# Patient Record
Sex: FEMALE | Race: WHITE | NOT HISPANIC OR LATINO | Employment: FULL TIME | URBAN - METROPOLITAN AREA
[De-identification: names, ages, dates, MRNs, and addresses within clinical notes are randomized per-mention and may not be internally consistent; named-entity substitution may affect disease eponyms.]

---

## 2024-10-21 ENCOUNTER — TELEPHONE (OUTPATIENT)
Dept: OBSTETRICS AND GYNECOLOGY | Facility: CLINIC | Age: 24
End: 2024-10-21
Payer: COMMERCIAL

## 2024-10-21 NOTE — TELEPHONE ENCOUNTER
----- Message from Alva MENDEZ sent at 10/21/2024  9:17 AM EDT -----  Please call pt back regarding her labs.

## 2024-10-25 ENCOUNTER — INITIAL PRENATAL (OUTPATIENT)
Dept: OBSTETRICS AND GYNECOLOGY | Facility: CLINIC | Age: 24
End: 2024-10-25
Payer: COMMERCIAL

## 2024-10-25 VITALS — DIASTOLIC BLOOD PRESSURE: 80 MMHG | WEIGHT: 171 LBS | SYSTOLIC BLOOD PRESSURE: 102 MMHG

## 2024-10-25 DIAGNOSIS — Z34.91 INITIAL OBSTETRIC VISIT IN FIRST TRIMESTER: Primary | ICD-10-CM

## 2024-10-25 DIAGNOSIS — Z72.0 VAPES NICOTINE CONTAINING SUBSTANCE: ICD-10-CM

## 2024-10-25 RX ORDER — PRENATAL VIT NO.126/IRON/FOLIC 28MG-0.8MG
TABLET ORAL DAILY
COMMUNITY

## 2024-10-25 NOTE — PROGRESS NOTES
Initial ob visit     CC- Here for care of pregnancy        Nora Monroy is a 24 y.o. female, , who presents for her first obstetrical visit.  No LMP recorded. Patient is pregnant.. Her MIKAYLA is 2025, by Ultrasound. Current GA is 6w3d.     Initial positive test date : 10/16/2024, UPT        Her periods are every 26 days.  Prior obstetric issues: none  Patient's past medical history is significant for:  denies .  Family history of genetic issues (includes FOB): denies  Prior infections concerning in pregnancy (Rash, fever in last 2 weeks): No  Varicella Hx - vaccinated  Prior testing for Cystic Fibrosis Carrier or Sickle Cell Trait- denies  Prepregnancy BMI - There is no height or weight on file to calculate BMI.  History of STD: no  Hx of HSV for patient or partner: no  Ultrasound Today: yes    OB History    Para Term  AB Living   1             SAB IAB Ectopic Molar Multiple Live Births                    # Outcome Date GA Lbr Barry/2nd Weight Sex Type Anes PTL Lv   1 Current                Additional Pertinent History   Last Pap :  Result: negative HPV: negative     Last Completed Pap Smear       This patient has no relevant Health Maintenance data.          History of abnormal Pap smear: no  Family history of uterine, colon, breast, or ovarian cancer: no  Feelings of Anxiety or Depression: yes - self controlled  Tobacco Usage?: No   Alcohol/Drug Use?: NO  Over the age of 35 at delivery: no  Genetic Screening: desires no genetic testing  Flu Status: Desires at future appt    [facility]    Current Outpatient Medications:     prenatal vitamin (prenatal, CLASSIC, vitamin) tablet, Take  by mouth Daily., Disp: , Rfl:      History reviewed. No pertinent past medical history.     Past Surgical History:   Procedure Laterality Date    FINGER SURGERY      thumb    WISDOM TOOTH EXTRACTION         Review of Systems   Review of Systems    Patient Reports:  denies  Patient Denies:excessive nausea ,  excessive vomiting, and vaginal bleeding  All systems reviewed and otherwise normal.    I have reviewed and agree with the HPI, ROS, and historical information as entered above. Sarah Spencer MD      /80   Wt 77.6 kg (171 lb)     The additional following portions of the patient's history were reviewed and updated as appropriate: allergies, current medications, past family history, past medical history, past social history, past surgical history, and problem list.    Physical Exam  General:  well developed; well nourished  no acute distress  mentation appropriate   Chest/Respiratory: No labored breathing, normal respiratory effort, normal appearance, no respiratory noises noted   Heart:  not examined   Thyroid: normal to inspection and palpation   Breasts:  Not performed.   Abdomen: soft, non-tender; no masses  no umbilical or inguinal hernias are present  no hepato-splenomegaly   Pelvis: Not performed.        Assessment and Plan    Problem List Items Addressed This Visit       Initial obstetric visit in first trimester - Primary    Relevant Orders    Obstetric Panel    HIV-1 / O / 2 Ag / Antibody    Urine Culture - Urine, Urine, Clean Catch    Chlamydia trachomatis, Neisseria gonorrhoeae, PCR - Urine, Urine, Clean Catch    Urine Drug Screen - Urine, Clean Catch    Vapes nicotine containing substance       Pregnancy at 6w3d  Reviewed routine prenatal care with the office and educational materials given  Lab(s) Ordered  Discussed options for genetic testing including first trimester nuchal translucency screen, genetic disease carrier testing, quadruple screen, and NIPT  Discontinue the use of all non-medicinal drugs and chemicals  Nausea/Vomiting - she does not desire medications at this time.  Discussed conservative ways to help with nausea.  Patient is on Prenatal vitamins  Activity recommendation : 150 minutes/week of moderate intensity aerobic activity unless we limit for bleeding, hypertension or  other pregnancy complication   Encouraged to quit vaping.  Return in about 4 weeks (around 11/22/2024).      Sarah Spencer MD  10/25/2024

## 2024-10-26 LAB
ABO GROUP BLD: NORMAL
BASOPHILS # BLD AUTO: 0 X10E3/UL (ref 0–0.2)
BASOPHILS NFR BLD AUTO: 0 %
BLD GP AB SCN SERPL QL: NEGATIVE
EOSINOPHIL # BLD AUTO: 0 X10E3/UL (ref 0–0.4)
EOSINOPHIL NFR BLD AUTO: 0 %
ERYTHROCYTE [DISTWIDTH] IN BLOOD BY AUTOMATED COUNT: 12.6 % (ref 11.7–15.4)
HBV SURFACE AG SERPL QL IA: NEGATIVE
HCT VFR BLD AUTO: 41.6 % (ref 34–46.6)
HCV IGG SERPL QL IA: NON REACTIVE
HGB BLD-MCNC: 13.3 G/DL (ref 11.1–15.9)
HIV 1+2 AB+HIV1 P24 AG SERPL QL IA: NON REACTIVE
IMM GRANULOCYTES # BLD AUTO: 0 X10E3/UL (ref 0–0.1)
IMM GRANULOCYTES NFR BLD AUTO: 0 %
LYMPHOCYTES # BLD AUTO: 1.5 X10E3/UL (ref 0.7–3.1)
LYMPHOCYTES NFR BLD AUTO: 21 %
MCH RBC QN AUTO: 27.7 PG (ref 26.6–33)
MCHC RBC AUTO-ENTMCNC: 32 G/DL (ref 31.5–35.7)
MCV RBC AUTO: 87 FL (ref 79–97)
MONOCYTES # BLD AUTO: 0.4 X10E3/UL (ref 0.1–0.9)
MONOCYTES NFR BLD AUTO: 6 %
NEUTROPHILS # BLD AUTO: 5.1 X10E3/UL (ref 1.4–7)
NEUTROPHILS NFR BLD AUTO: 73 %
PLATELET # BLD AUTO: 244 X10E3/UL (ref 150–450)
RBC # BLD AUTO: 4.81 X10E6/UL (ref 3.77–5.28)
RH BLD: POSITIVE
RPR SER QL: NON REACTIVE
RUBV IGG SERPL IA-ACNC: 1.95 INDEX
WBC # BLD AUTO: 7.1 X10E3/UL (ref 3.4–10.8)

## 2024-10-28 LAB
AMPHETAMINES UR QL SCN: NEGATIVE NG/ML
BACTERIA UR CULT: NORMAL
BACTERIA UR CULT: NORMAL
BARBITURATES UR QL SCN: NEGATIVE NG/ML
BENZODIAZ UR QL SCN: NEGATIVE NG/ML
BZE UR QL SCN: NEGATIVE NG/ML
C TRACH RRNA SPEC QL NAA+PROBE: NEGATIVE
CANNABINOIDS UR QL SCN: NEGATIVE NG/ML
CREAT UR-MCNC: 148.7 MG/DL (ref 20–300)
LABORATORY COMMENT REPORT: NORMAL
METHADONE UR QL SCN: NEGATIVE NG/ML
N GONORRHOEA RRNA SPEC QL NAA+PROBE: NEGATIVE
OPIATES UR QL SCN: NEGATIVE NG/ML
OXYCODONE+OXYMORPHONE UR QL SCN: NEGATIVE NG/ML
PCP UR QL: NEGATIVE NG/ML
PH UR: 6.9 [PH] (ref 4.5–8.9)
PROPOXYPH UR QL SCN: NEGATIVE NG/ML

## 2024-11-21 ENCOUNTER — ROUTINE PRENATAL (OUTPATIENT)
Dept: OBSTETRICS AND GYNECOLOGY | Facility: CLINIC | Age: 24
End: 2024-11-21
Payer: COMMERCIAL

## 2024-11-21 VITALS — WEIGHT: 173 LBS | SYSTOLIC BLOOD PRESSURE: 110 MMHG | DIASTOLIC BLOOD PRESSURE: 78 MMHG

## 2024-11-21 DIAGNOSIS — Z34.91 PRENATAL CARE IN FIRST TRIMESTER: Primary | ICD-10-CM

## 2024-11-21 PROBLEM — Z34.90 PREGNANCY: Status: ACTIVE | Noted: 2024-10-25

## 2024-11-21 NOTE — PROGRESS NOTES
OB FOLLOW UP  CC- Here for care of pregnancy        Nora Monroy is a 24 y.o.  10w2d patient being seen today for her obstetrical follow up visit. Patient reports no complaints.     Her prenatal care is complicated by (and status) :   Patient Active Problem List   Diagnosis    Pregnancy    Vapes nicotine containing substance       Genetic testing?: declines.  NOB labs reviewed  Flu Status: Will get at work/pharmacy/other facility   Ultrasound Today: No    ROS -   Patient Denies: leaking of fluid, vaginal bleeding, dysuria, excessive vomiting, and more than 6 contractions per hour  All other systems reviewed and are negative.     The additional following portions of the patient's history were reviewed and updated as appropriate: allergies, current medications, past family history, past medical history, past social history, past surgical history, and problem list.    I have reviewed and agree with the HPI, ROS, and historical information as entered above. Jessie Tejeda, APRN          /78   Wt 78.5 kg (173 lb)         EXAM:     Prenatal Vitals  BP: 110/78  Weight: 78.5 kg (173 lb)   Fetal Heart Rate: pos                  Assessment and Plan    Problem List Items Addressed This Visit    None  Visit Diagnoses       Prenatal care in first trimester    -  Primary    Relevant Orders    [id number] PLUS Core+SCA+ESS - Blood,            Pregnancy at 10w2d  Labs reviewed from New OB Visit.  Counseled on genetic testing, carrier status and option for NT screen  Activity and Exercise discussed.  Patient is on Prenatal vitamins  Return in about 4 weeks (around 2024) for CASE ANIRUDH.    Jessie Tejeda, SHERRILL  2024

## 2024-12-20 ENCOUNTER — ROUTINE PRENATAL (OUTPATIENT)
Dept: OBSTETRICS AND GYNECOLOGY | Facility: CLINIC | Age: 24
End: 2024-12-20
Payer: COMMERCIAL

## 2024-12-20 VITALS — DIASTOLIC BLOOD PRESSURE: 76 MMHG | WEIGHT: 177.6 LBS | SYSTOLIC BLOOD PRESSURE: 112 MMHG

## 2024-12-20 DIAGNOSIS — Z34.02 FIRST PREGNANCY, SECOND TRIMESTER: Primary | ICD-10-CM

## 2024-12-20 DIAGNOSIS — Z36.89 ENCOUNTER FOR FETAL ANATOMIC SURVEY: ICD-10-CM

## 2024-12-20 NOTE — PROGRESS NOTES
OB FOLLOW UP  CC- Here for care of pregnancy        Nora Monroy is a 24 y.o.  14w3d patient being seen today for her obstetrical follow up visit. Patient reports having lower abdominal cramping that comes and goes.      Her prenatal care is complicated by (and status) :   Patient Active Problem List   Diagnosis    Pregnancy    Vapes nicotine containing substance       Genetic testing?: already completed and was normal.  NOB labs reviewed  Flu Status: Will get at work/pharmacy/other facility   Ultrasound Today: No    ROS -   Patient Denies: leaking of fluid, vaginal bleeding, dysuria, excessive vomiting, and more than 6 contractions per hour  All other systems reviewed and are negative.     The additional following portions of the patient's history were reviewed and updated as appropriate: allergies, current medications, past family history, past medical history, past social history, past surgical history, and problem list.    I have reviewed and agree with the HPI, ROS, and historical information as entered above. Daja Vargas MD          /76   Wt 80.6 kg (177 lb 9.6 oz)         EXAM:     Prenatal Vitals  BP: 112/76  Weight: 80.6 kg (177 lb 9.6 oz)   Fetal Heart Rate: pos                  Assessment and Plan    Problem List Items Addressed This Visit    None  Visit Diagnoses       First pregnancy, second trimester    -  Primary    Encounter for fetal anatomic survey        Relevant Orders    US Ob 14 + Weeks Single or First Gestation            Pregnancy at 14w3d  Labs reviewed from New OB Visit.  Counseled on genetic testing, carrier status and option for NT screen  Activity and Exercise discussed.  Patient is on Prenatal vitamins  Return in about 4 weeks (around 2025) for US with Next Visit.    Daja Vargas MD  2024

## 2025-01-17 ENCOUNTER — ROUTINE PRENATAL (OUTPATIENT)
Dept: OBSTETRICS AND GYNECOLOGY | Facility: CLINIC | Age: 25
End: 2025-01-17
Payer: COMMERCIAL

## 2025-01-17 VITALS — DIASTOLIC BLOOD PRESSURE: 70 MMHG | SYSTOLIC BLOOD PRESSURE: 102 MMHG | WEIGHT: 184 LBS

## 2025-01-17 DIAGNOSIS — Z34.02 FIRST PREGNANCY, SECOND TRIMESTER: Primary | ICD-10-CM

## 2025-01-17 DIAGNOSIS — O28.3 ABNORMAL FETAL ULTRASOUND: ICD-10-CM

## 2025-01-17 LAB
GLUCOSE UR STRIP-MCNC: NEGATIVE MG/DL
PROT UR STRIP-MCNC: NEGATIVE MG/DL

## 2025-01-17 NOTE — PROGRESS NOTES
OB FOLLOW UP  CC- Here for care of pregnancy        Nora Monroy is a 24 y.o.  18w3d patient being seen today for her obstetrical follow up visit. Patient reports having rash on palm of right hand she has used hydrocortisone cream but it comes back and has spread into fingers     Her prenatal care is complicated by (and status) : see below.  Patient Active Problem List   Diagnosis    Pregnancy    Vapes nicotine containing substance       Flu Status: Will get at work/pharmacy/other facility   Ultrasound Today: Yes    AFP: is undecided about    ROS -   Patient Denies: leaking of fluid, vaginal bleeding, dysuria, excessive vomiting, and more than 6 contractions per hour  All other systems reviewed and are negative.       The additional following portions of the patient's history were reviewed and updated as appropriate: allergies, current medications, past family history, past medical history, past social history, past surgical history, and problem list.      I have reviewed and agree with the HPI, ROS, and historical information as entered above. Daja Vargas MD          EXAM:     Prenatal Vitals  BP: 102/70  Weight: 83.5 kg (184 lb)   Fetal Heart Rate: pos         Urine Glucose Read-only: Negative  Urine Protein Read-only: Negative           Assessment and Plan    Problem List Items Addressed This Visit    None  Visit Diagnoses       First pregnancy, second trimester    -  Primary    Relevant Orders    POC Urinalysis Dipstick (Completed)    Abnormal fetal ultrasound        Relevant Orders    [facility]            Pregnancy at 18w3d  Fetal status reassuring.   U/S reviewed and poss VSD, EIF noted.  PDC in 3-4 weeks and see me in [place].  Marginal cord insertion.    Anatomy scan next visit.   Activity and Exercise discussed.  Patient is on Prenatal vitamins  Return in about 3 weeks (around 2025), or pdc scan and see me in [place] 3-4 weeks., for US with Next Visit.    Daja SANTIAGO  MD Alicia  01/17/2025

## 2025-02-06 ENCOUNTER — TELEPHONE (OUTPATIENT)
Dept: OBSTETRICS AND GYNECOLOGY | Facility: CLINIC | Age: 25
End: 2025-02-06
Payer: COMMERCIAL

## 2025-02-06 RX ORDER — AMOXICILLIN 500 MG/1
500 CAPSULE ORAL 2 TIMES DAILY
Qty: 20 CAPSULE | Refills: 0 | Status: SHIPPED | OUTPATIENT
Start: 2025-02-06 | End: 2025-02-16

## 2025-02-06 NOTE — TELEPHONE ENCOUNTER
Reviewed with Yamilka neal with sending in Amoxicillin and offer Paxlovid.  Returned call to patient and offered paxlovid.  Patient agreeable. Reviewed safe to take medications for cough/cold./congestion with patient, and she voiced understanding.  Homa send in discussed medications to pharmacy on file.      Will send Pristones message to f/u with pcp PRN

## 2025-02-06 NOTE — TELEPHONE ENCOUNTER
LOS patient- 21 2/7    Returned patient call- she reports having a colleague at work ([employer]) test her for flu/covid/strep due to beginning to feel bad this morning.  She c/o sore throat, fever and fluid in her ears.  She reports +Covid and faintly + strep, negative for flu.  Is wondering if we can send in prescription.  Advised patient that I would discuss with Homa and return her call.  She verified and voiced understanding.     Requesting rx be sent to [facility], [place]

## 2025-02-06 NOTE — TELEPHONE ENCOUNTER
Provider: DR. GALAN     Caller: Nora Monroy    Relationship to Patient: Self    Pharmacy: [facility] IN [place]     Phone Number: [phone]    Reason for Call: COVID POSITIVE - POSSIBLE STREP POSITIVE     When was the patient last seen: 01/17/25    When did it start: TODAY    Where is it located: NOSE, THROAT    Characteristics of symptom/severity: SORE THROAT, STUFFY, FEVER, FLUID IN EARS     HUB WAS UNABLE TO WARM TRANSFER     PATIENT IS WANTING TO KNOW WHAT IS SAFE TO TAKE.    ALSO IS NOT ABLE PCP UNTIL LATE NEXT WEEK. PATIENT WAS WONDERING IF DR GALAN COULD CALL SOMETHING IN.

## 2025-02-13 ENCOUNTER — HOSPITAL ENCOUNTER (OUTPATIENT)
Dept: WOMENS IMAGING | Facility: HOSPITAL | Age: 25
Discharge: HOME | End: 2025-02-13
Admitting: OBSTETRICS & GYNECOLOGY
Payer: COMMERCIAL

## 2025-02-13 ENCOUNTER — ROUTINE PRENATAL (OUTPATIENT)
Dept: OBSTETRICS AND GYNECOLOGY | Facility: CLINIC | Age: 25
End: 2025-02-13
Payer: COMMERCIAL

## 2025-02-13 ENCOUNTER — OFFICE VISIT (OUTPATIENT)
Dept: OBSTETRICS AND GYNECOLOGY | Facility: HOSPITAL | Age: 25
End: 2025-02-13
Payer: COMMERCIAL

## 2025-02-13 VITALS
WEIGHT: 188.2 LBS | HEIGHT: 63 IN | BODY MASS INDEX: 33.35 KG/M2 | SYSTOLIC BLOOD PRESSURE: 110 MMHG | DIASTOLIC BLOOD PRESSURE: 83 MMHG

## 2025-02-13 VITALS
DIASTOLIC BLOOD PRESSURE: 70 MMHG | SYSTOLIC BLOOD PRESSURE: 102 MMHG | BODY MASS INDEX: 33.13 KG/M2 | HEIGHT: 63 IN | WEIGHT: 187 LBS

## 2025-02-13 DIAGNOSIS — Z34.92 PRENATAL CARE IN SECOND TRIMESTER: Primary | ICD-10-CM

## 2025-02-13 DIAGNOSIS — Z3A.22 22 WEEKS GESTATION OF PREGNANCY: ICD-10-CM

## 2025-02-13 DIAGNOSIS — O35.9XX0 SUSPECTED FETAL ABNORMALITY AFFECTING MANAGEMENT OF MOTHER, ANTEPARTUM, SINGLE OR UNSPECIFIED FETUS: Primary | ICD-10-CM

## 2025-02-13 LAB
GLUCOSE UR STRIP-MCNC: NEGATIVE MG/DL
PROT UR STRIP-MCNC: NEGATIVE MG/DL

## 2025-02-13 PROCEDURE — 76811 OB US DETAILED SNGL FETUS: CPT

## 2025-02-13 PROCEDURE — 93325 DOPPLER ECHO COLOR FLOW MAPG: CPT

## 2025-02-13 PROCEDURE — 76825 ECHO EXAM OF FETAL HEART: CPT

## 2025-02-13 NOTE — PROGRESS NOTES
"    OB FOLLOW UP  CC- Here for care of pregnancy        Nora Monroy is a 24 y.o.  22w2d patient being seen today for her obstetrical follow up visit. Patient reports vision changes. Patient states that she is having blurred vision if she is not wearing her glasses. Patient is currently taking Amoxicillin for Strep. Patient also reports intermittent rash on her right palm that has since resolved with antifungal cream.     Her prenatal care is complicated by (and status) : see below.  Patient Active Problem List   Diagnosis    Pregnancy    Vapes nicotine containing substance       Flu Status: Declines  Ultrasound Today: Yes with PDC  Reviewed 1 hr glucose testing and TDAP next visit.    ROS -   Patient Denies: leaking of fluid, vaginal bleeding, dysuria, excessive vomiting, and more than 6 contractions per hour  Fetal Movement : normal  Other than what is documented in the HPI, all other systems reviewed and are negative.       The additional following portions of the patient's history were reviewed and updated as appropriate: allergies and current medications.      I have reviewed and agree with the HPI, ROS, and historical information as entered above. Daja Vargas MD      /70   Ht 160 cm (63\")   Wt 84.8 kg (187 lb)   BMI 33.13 kg/m²       EXAM:     Prenatal Vitals  BP: 102/70  Weight: 84.8 kg (187 lb)   Fetal Heart Rate: pos               Urine Glucose Read-only: Negative  Urine Protein Read-only: Negative       Assessment and Plan    Problem List Items Addressed This Visit    None  Visit Diagnoses       Prenatal care in second trimester    -  Primary    Relevant Orders    POC Urinalysis Dipstick (Completed)            Pregnancy at 22w2d  Fetal status reassuring.  anatomy scan completed today and within normal limits. At PDC  1 hour gtt, CBC, Antibody screen, and RPR next visit. Instructions given  Discussed/encouraged TDAP vaccination after 28 weeks  Activity and Exercise " discussed.  Return in about 22 days (around 3/7/2025), or [place], for One hour glucola.      Daja Vargas MD  02/13/2025

## 2025-02-21 PROBLEM — O35.9XX0 SUSPECTED FETAL ABNORMALITY AFFECTING MANAGEMENT OF MOTHER, ANTEPARTUM: Status: ACTIVE | Noted: 2025-02-21

## 2025-02-21 NOTE — PROGRESS NOTES
Patient seen in Maternal Fetal Medicine clinic today. Please see full note in under imaging tab of patient chart in Epic (Viewpoint report).    Maci Albert MD

## 2025-03-03 ENCOUNTER — TELEPHONE (OUTPATIENT)
Dept: OBSTETRICS AND GYNECOLOGY | Facility: CLINIC | Age: 25
End: 2025-03-03
Payer: COMMERCIAL

## 2025-03-03 RX ORDER — LANSOPRAZOLE 30 MG/1
30 CAPSULE, DELAYED RELEASE ORAL DAILY
Qty: 30 CAPSULE | Refills: 1 | Status: SHIPPED | OUTPATIENT
Start: 2025-03-03 | End: 2026-03-03

## 2025-03-03 NOTE — TELEPHONE ENCOUNTER
Provider: DR GALAN    Caller: Nora Monroy    Relationship to Patient: Self    Phone Number: [phone]    Reason for Call: OB PT CALLED STATED SHE HAS TRIED PEPCID, OMEPRAZOLE 20MG FOR HEART BURN AND NOTHING IS HELPING HER; PT WOULD LIKE TO KNOW IF THERE IS SOMETHING STRONGER THAT COULD HELP HER THAT IS SAFE FOR HER TO TAKE; PT STATED IT IS MISERABLE; PT STATED IT FEELS LIKE HER DIAPHRAGM FEELS REALLY TIGHT AS WELL.    PLEASE CALL PT TO ADVISE.

## 2025-03-03 NOTE — TELEPHONE ENCOUNTER
Reviewed with Yamilka neal to do d/c omeprazole and do prevacid, and rx sent.  Returned patient call and reviewed this with patient.  She verified and voiced understanding

## 2025-03-07 ENCOUNTER — ROUTINE PRENATAL (OUTPATIENT)
Dept: OBSTETRICS AND GYNECOLOGY | Facility: CLINIC | Age: 25
End: 2025-03-07
Payer: COMMERCIAL

## 2025-03-07 VITALS — WEIGHT: 191.8 LBS | DIASTOLIC BLOOD PRESSURE: 70 MMHG | SYSTOLIC BLOOD PRESSURE: 104 MMHG | BODY MASS INDEX: 33.98 KG/M2

## 2025-03-07 DIAGNOSIS — Z34.02 FIRST PREGNANCY, SECOND TRIMESTER: ICD-10-CM

## 2025-03-07 DIAGNOSIS — Z13.1 SCREENING FOR DIABETES MELLITUS: Primary | ICD-10-CM

## 2025-03-07 LAB
GLUCOSE UR STRIP-MCNC: NEGATIVE MG/DL
PROT UR STRIP-MCNC: NEGATIVE MG/DL

## 2025-03-07 NOTE — PROGRESS NOTES
OB FOLLOW UP  CC- Here for care of pregnancy        Nora Monroy is a 25 y.o.  25w3d patient being seen today for her obstetrical follow up. Patient reports having severe acid reflux. She started taking Lansoprazole 4 days ago and feels like it may be helping.     Patient undergoing Glucola testing today. She is due for her testing at 10:52AM.       MBT: A+  Rhogam: is not indicated.  28 week packet: reviewed with patient , counseled on fetal movement , and breast pump discussed  TDAP: will receive next visit  Flu Status: Declines  Ultrasound Today: No    Her prenatal care is complicated by (and status) : see below.  Patient Active Problem List   Diagnosis    Pregnancy    Vapes nicotine containing substance    Suspected fetal abnormality affecting management of mother, antepartum         ROS -   Patient Denies: Loss of Fluid, Vaginal Spotting, Vision Changes, Headaches, Nausea , Vomiting , Contractions, Epigastric pain, and skin itching  Fetal Movement : normal  Other than what is documented in the HPI, all other systems reviewed and are negative.     The additional following portions of the patient's history were reviewed and updated as appropriate: allergies and current medications.    I have reviewed and agree with the HPI, ROS, and historical information as entered above. Daja Vargas MD      /70   Wt 87 kg (191 lb 12.8 oz)   BMI 33.98 kg/m²         EXAM:     Prenatal Vitals  BP: 104/70  Weight: 87 kg (191 lb 12.8 oz)                   Urine Glucose Read-only: Negative  Urine Protein Read-only: Negative         Assessment and Plan    Problem List Items Addressed This Visit    None  Visit Diagnoses       Screening for diabetes mellitus    -  Primary    Relevant Orders    CBC (No Diff)    Gestational Screen 1 Hr ([facility])    Antibody Screen    RPR, Rfx Qn RPR / Confirm TP    First pregnancy, second trimester        Relevant Orders    POC Urinalysis Dipstick (Completed)             Pregnancy at 25w3d  1 hr Glucola, CBC, RPR. Antibody screen and TDAP today  Fetal movement/PTL or Labor precautions  Activity and Exercise discussed.  Return in about 4 weeks (around 4/4/2025).        Daja Vargas MD  03/07/2025

## 2025-03-08 LAB
BLD GP AB SCN SERPL QL: NEGATIVE
ERYTHROCYTE [DISTWIDTH] IN BLOOD BY AUTOMATED COUNT: 13.1 % (ref 11.7–15.4)
GLUCOSE 1H P 50 G GLC PO SERPL-MCNC: 121 MG/DL (ref 70–139)
HCT VFR BLD AUTO: 32 % (ref 34–46.6)
HGB BLD-MCNC: 10.4 G/DL (ref 11.1–15.9)
MCH RBC QN AUTO: 26.9 PG (ref 26.6–33)
MCHC RBC AUTO-ENTMCNC: 32.5 G/DL (ref 31.5–35.7)
MCV RBC AUTO: 83 FL (ref 79–97)
PLATELET # BLD AUTO: 151 X10E3/UL (ref 150–450)
RBC # BLD AUTO: 3.86 X10E6/UL (ref 3.77–5.28)
RPR SER QL: NON REACTIVE
WBC # BLD AUTO: 8.2 X10E3/UL (ref 3.4–10.8)

## 2025-03-23 ENCOUNTER — HOSPITAL ENCOUNTER (OUTPATIENT)
Facility: HOSPITAL | Age: 25
Discharge: HOME | End: 2025-03-23
Attending: OBSTETRICS & GYNECOLOGY | Admitting: OBSTETRICS & GYNECOLOGY
Payer: COMMERCIAL

## 2025-03-23 VITALS
HEART RATE: 116 BPM | DIASTOLIC BLOOD PRESSURE: 83 MMHG | OXYGEN SATURATION: 99 % | SYSTOLIC BLOOD PRESSURE: 121 MMHG | BODY MASS INDEX: 34.2 KG/M2 | HEIGHT: 63 IN | TEMPERATURE: 98.4 F | RESPIRATION RATE: 19 BRPM | WEIGHT: 193 LBS

## 2025-03-23 LAB
BACTERIA UR QL AUTO: ABNORMAL /HPF
BILIRUB UR QL STRIP: NEGATIVE
CLARITY UR: CLEAR
COLOR UR: YELLOW
GLUCOSE UR STRIP-MCNC: NEGATIVE MG/DL
HGB UR QL STRIP.AUTO: NEGATIVE
HYALINE CASTS UR QL AUTO: ABNORMAL /LPF
KETONES UR QL STRIP: NEGATIVE
LEUKOCYTE ESTERASE UR QL STRIP.AUTO: ABNORMAL
NITRITE UR QL STRIP: NEGATIVE
PH UR STRIP.AUTO: 7.5 [PH] (ref 5–8)
PROT UR QL STRIP: NEGATIVE
RBC # UR STRIP: ABNORMAL /HPF
REF LAB TEST METHOD: ABNORMAL
SP GR UR STRIP: 1.01 (ref 1–1.03)
SQUAMOUS #/AREA URNS HPF: ABNORMAL /HPF
UROBILINOGEN UR QL STRIP: ABNORMAL
WBC # UR STRIP: ABNORMAL /HPF

## 2025-03-23 PROCEDURE — 59025 FETAL NON-STRESS TEST: CPT | Performed by: OBSTETRICS & GYNECOLOGY

## 2025-03-23 PROCEDURE — 99213 OFFICE O/P EST LOW 20 MIN: CPT | Performed by: OBSTETRICS & GYNECOLOGY

## 2025-03-23 PROCEDURE — 25810000003 LACTATED RINGERS SOLUTION: Performed by: OBSTETRICS & GYNECOLOGY

## 2025-03-23 PROCEDURE — G0463 HOSPITAL OUTPT CLINIC VISIT: HCPCS

## 2025-03-23 PROCEDURE — 81001 URINALYSIS AUTO W/SCOPE: CPT | Performed by: OBSTETRICS & GYNECOLOGY

## 2025-03-23 PROCEDURE — 59025 FETAL NON-STRESS TEST: CPT

## 2025-03-23 RX ORDER — SODIUM CHLORIDE 0.9 % (FLUSH) 0.9 %
10 SYRINGE (ML) INJECTION EVERY 12 HOURS SCHEDULED
Status: DISCONTINUED | OUTPATIENT
Start: 2025-03-23 | End: 2025-03-23 | Stop reason: HOSPADM

## 2025-03-23 RX ORDER — SODIUM CHLORIDE 0.9 % (FLUSH) 0.9 %
10 SYRINGE (ML) INJECTION AS NEEDED
Status: DISCONTINUED | OUTPATIENT
Start: 2025-03-23 | End: 2025-03-23 | Stop reason: HOSPADM

## 2025-03-23 RX ADMIN — SODIUM CHLORIDE, SODIUM LACTATE, POTASSIUM CHLORIDE, CALCIUM CHLORIDE 1000 ML: 20; 30; 600; 310 INJECTION, SOLUTION INTRAVENOUS at 10:15

## 2025-03-23 NOTE — H&P
"[facility]  Obstetric History and Physical    Chief Complaint   Patient presents with    Decreased Fetal Movement       HPI:      Patient is a 25 y.o. female  currently at 27w5d, who presents with decreased fetal movement.  She says she had not felt much in any movements in the last 2 days.   This changed as soon and the fetal monitor straps were placed where she could both hear and feel movements.  She was also elen , but did not feel them.  She received IV hydration and they all but stopped.   Denies vaginal leaking and bleeding.          The following portions of the patients history were reviewed and updated as appropriate: current medications, allergies, past medical history, past surgical history, past family history, past social history and problem list .       Prenatal Information:   Maternal Prenatal Labs  Blood Type No results found for: \"ABO\"   Rh Status No results found for: \"RH\"   Antibody Screen No results found for: \"ABSCRN\"   Gonnorhea No results found for: \"GCCX\"   Chlamydia No results found for: \"CLAMYDCU\"   RPR No results found for: \"RPR\"   Syphilis Antibody No results found for: \"SYPHILIS\"   Rubella No results found for: \"RUBELLAIGGIN\"   Hepatitis B Surface Antigen No results found for: \"HEPBSAG\"   HIV-1 Antibody No results found for: \"LABHIV1\"   Hepatitis C Antibody No results found for: \"HEPCAB\"   Rapid Urin Drug Screen No results found for: \"AMPMETHU\", \"BARBITSCNUR\", \"LABBENZSCN\", \"LABMETHSCN\", \"LABOPIASCN\", \"THCURSCR\", \"COCAINEUR\", \"AMPHETSCREEN\", \"PROPOXSCN\", \"BUPRENORSCNU\", \"METAMPSCNUR\", \"OXYCODONESCN\", \"TRICYCLICSCN\"   Group B Strep Culture No results found for: \"GBSANTIGEN\"           External Prenatal Results       Pregnancy Outside Results - Transcribed From Office Records - See Scanned Records For Details       Test Value Date Time    ABO  A  10/25/24 0931    Rh  Positive  10/25/24 0931    Antibody Screen  Negative  25 1045       Negative  10/25/24 0931    " Varicella IgG       Rubella  1.95 index 10/25/24 0931    Hgb  10.4 g/dL 25 1045       13.3 g/dL 10/25/24 0931    Hct  32.0 % 25 1045       41.6 % 10/25/24 0931    HgB A1c        1h GTT  121 mg/dL 25 1045    3h GTT Fasting       3h GTT 1 hour       3h GTT 2 hour       3h GTT 3 hour        Gonorrhea (discrete)  Negative  10/25/24 1000    Chlamydia (discrete)  Negative  10/25/24 1000    RPR  Non Reactive  25 1045       Non Reactive  10/25/24 0931    Syphils cascade: TP-Ab (FTA)       TP-Ab       TP-Ab (EIA)       TPPA       HBsAg  Negative  10/25/24 0931    Herpes Simplex Virus PCR       Herpes Simplex VIrus Culture       HIV  Non Reactive  10/25/24 0931    Hep C RNA Quant PCR       Hep C Antibody  Non Reactive  10/25/24 0931    AFP       NIPT       Cystic Fibrosis ([place])       Cystic Fibroisis        Spinal Muscular atrophy       Fragile X       Group B Strep       GBS Susceptibility to Clindamycin       GBS Susceptibility to Erythromycin       Fetal Fibronectin       Genetic Testing, Maternal Blood                 Drug Screening       Test Value Date Time    Urine Drug Screen       Amphetamine Screen  Negative ng/mL 10/25/24 1000    Barbiturate Screen  Negative ng/mL 10/25/24 1000    Benzodiazepine Screen  Negative ng/mL 10/25/24 1000    Methadone Screen  Negative ng/mL 10/25/24 1000    Phencyclidine Screen  Negative ng/mL 10/25/24 1000    Opiates Screen       THC Screen       Cocaine Screen       Propoxyphene Screen  Negative ng/mL 10/25/24 1000    Buprenorphine Screen       Methamphetamine Screen       Oxycodone Screen       Tricyclic Antidepressants Screen                 Legend    ^: Historical                              Past OB History:     OB History    Para Term  AB Living   1 0 0 0 0 0   SAB IAB Ectopic Molar Multiple Live Births   0 0 0 0 0 0      # Outcome Date GA Lbr Barry/2nd Weight Sex Type Anes PTL Lv   1 Current                Past Medical History: Past Medical  History:   Diagnosis Date    Anxiety     History of COVID-19 02/04/2025      Past Surgical History Past Surgical History:   Procedure Laterality Date    FINGER SURGERY Left     thumb    WISDOM TOOTH EXTRACTION        Family History: Family History   Problem Relation Age of Onset    Breast cancer Neg Hx     Ovarian cancer Neg Hx     Uterine cancer Neg Hx     Colon cancer Neg Hx       Social History:  reports that she has never smoked. She has never used smokeless tobacco.   reports that she does not currently use alcohol.   reports no history of drug use.            Objective     Vital Signs Range for the last 24 hours  Temperature: Temp:  [98.4 °F (36.9 °C)] 98.4 °F (36.9 °C)   Temp Source: Temp src: Oral   BP: BP: (121)/(83) 121/83   Pulse: Heart Rate:  [116] 116   Respirations: Resp:  [19] 19   SPO2: SpO2:  [98 %-99 %] 99 %   O2 Amount (l/min):     O2 Devices     Weight: Weight:  [87.5 kg (193 lb)] 87.5 kg (193 lb)     Physical Examination: General appearance - alert, well appearing, and in no distress and oriented to person, place, and time  Chest - Breathing is unlaboured   Heart - Tachycardia  rate   Abdomen - soft, nontender, nondistended, gravid uterus   Extremities - pedal edema +1        Cervix: Exam by: Method: sterile vaginal exam performed   Dilation:  Closed   Effacement: Cervical Effacement: 30   Station:  -3     Fetal Heart Rate Assessment   Method:     Beats/min:     Baseline:     Varibility:     Accels:     Decels:     Tracing Category:       Uterine Assessment   Method: Method: palpation   Frequency (min):     Ctx Count in 10 min:     Duration:     Intensity: Contraction Intensity: mild by palpation   Intensity by IUPC:     Resting Tone: Uterine Resting Tone: soft by palpation   Resting Tone by IUPC:     [place] Units:      NST                     Indication  Start time: 1010                  End time: 1055  15 x 15 accels x 2  Yes  No decels  Baseline   120s  Reactive NST - Yes            Assessment/Plan  1. Intrauterine pregnancy at 27w5d gestation with reactive fetal status  2. Decreased fetal movement  - resolved with RNST and good fetal movements   3.  contractions.   Was not felt by the patient and resolved with IV hydration.   No S/S of  labour   4.  Given education and reassurance   5.  Questions answered   6.  D/C home.      Philippe Verma MD  3/23/2025  11:03 EDT

## 2025-03-23 NOTE — DISCHARGE INSTRUCTIONS
Return to labor [facility] if any of the following occur: decreased fetal movement, vaginal bleeding, leaking of fluid

## 2025-03-23 NOTE — NURSING NOTE
Pt discharged home per MD order. RN instructed pt to return to labor [facility] if any of the following occur: decreased fetal movement, vaginal bleeding, leaking of fluid. Pt verbalized understanding. Pt ambulating off unit in stable condition with s/o and all personal belongings.

## 2025-03-31 ENCOUNTER — TELEPHONE (OUTPATIENT)
Dept: OBSTETRICS AND GYNECOLOGY | Facility: CLINIC | Age: 25
End: 2025-03-31
Payer: COMMERCIAL

## 2025-03-31 NOTE — TELEPHONE ENCOUNTER
Hub staff attempted to follow warm transfer process and was unsuccessful     Caller: Nora Mnoroy    Relationship to patient: Self    Best call back number: [phone] CALL ANYTIME. IF CALL BACK GOES TO VOICEMAIL PLEASE SEND mySBX MESSAGE.     Patient is needing: PATIENT RETURNED AILYN'S CALL. HUB UNABLE TO TRANSFER CALL TO CLINICAL.

## 2025-03-31 NOTE — TELEPHONE ENCOUNTER
Returned patient's call.   Has been having vomiting and diarrhea.  She was able to retain soup and pizza yesterday but vomited today.  She went to [facility] (Urgent Care) in [place] today; viral illness. Was told to hydrate well.  Reports normal fetal movement.   Asking if she can use Liquid IV for electrolyte replacement. Discussed using a couple of times per day; hydrating well; bland diet; and call if unable to retain anything for 24 hours.   Patient v/u and agreed. States she has retained water and crackers since seen in [facility] today.

## 2025-03-31 NOTE — TELEPHONE ENCOUNTER
Patient of Dr. Vargas; G1 @ 28w 6d. LOV 03/07/25; NOV 04/04/25.  Attempted to return patient's call. Left voice message to call us back.

## 2025-03-31 NOTE — TELEPHONE ENCOUNTER
Provider: DR. GALAN    Caller: Nora Monroy    Relationship to Patient: Self    Pharmacy:     Phone Number: [phone]    Reason for Call: ADVICE ONLY    When was the patient last seen: 03.07.25    PATIENT WAS SEEN AT  ED ON 03.23.25 AND SINCE THEN THE PATIENT HAS VOMITED TWICE AND HAS HAD LIQUID DIARRHEA  SINCE YESTERDAY MORNING AND PATIENT STATES IT'S BEEN QUITE A FEW TIMES. PATIENT WOULD LIKE TO KNOW IF SHE CAN PUT LIQUID IV IN HER WATER SO SHE DOESN'T GET DEHYDRATED. IF THIS IS OK PLEASE LET PATIENT KNOW AND ALSO LET PATIENT KNOW EITHER WAY WHAT HER OPTIONS ARE.    PATIENT CAN BE REACHED .[phone]    THANK YOU

## 2025-04-04 ENCOUNTER — ROUTINE PRENATAL (OUTPATIENT)
Dept: OBSTETRICS AND GYNECOLOGY | Facility: CLINIC | Age: 25
End: 2025-04-04
Payer: COMMERCIAL

## 2025-04-04 VITALS — BODY MASS INDEX: 35 KG/M2 | SYSTOLIC BLOOD PRESSURE: 108 MMHG | WEIGHT: 197.6 LBS | DIASTOLIC BLOOD PRESSURE: 74 MMHG

## 2025-04-04 DIAGNOSIS — Z3A.29 29 WEEKS GESTATION OF PREGNANCY: Primary | ICD-10-CM

## 2025-04-04 DIAGNOSIS — O99.210 OBESITY AFFECTING PREGNANCY, ANTEPARTUM, UNSPECIFIED OBESITY TYPE: ICD-10-CM

## 2025-04-04 LAB
GLUCOSE UR STRIP-MCNC: NEGATIVE MG/DL
PROT UR STRIP-MCNC: NEGATIVE MG/DL

## 2025-04-04 NOTE — PROGRESS NOTES
OB FOLLOW UP  CC- Here for care of pregnancy        Nora Monroy is a 25 y.o.  29w3d patient being seen today for her obstetrical follow up visit. Patient reports no complaints.     Her prenatal care is complicated by (and status) :  see below.  Patient Active Problem List   Diagnosis    Pregnancy    Vapes nicotine containing substance    Suspected fetal abnormality affecting management of mother, antepartum       Flu Status: Declines  TDAP status: declines   Rhogam status: was not indicated  28 week labs: Reviewed  Ultrasound Today: No  Non Stress Test: No.      ROS -   Patient Denies: Loss of Fluid, Vaginal Spotting, Vision Changes, Headaches, Nausea , Vomiting , Contractions, Epigastric pain, and skin itching  Fetal Movement : normal  Other than what is documented in the HPI, all other systems reviewed and are negative.     The additional following portions of the patient's history were reviewed and updated as appropriate: allergies, current medications, past family history, past medical history, past social history, past surgical history, and problem list.    I have reviewed and agree with the HPI, ROS, and historical information as entered above. Daja Vargas MD      /74   Wt 89.6 kg (197 lb 9.6 oz)   BMI 35.00 kg/m²         EXAM:     Prenatal Vitals  BP: 108/74  Weight: 89.6 kg (197 lb 9.6 oz)   Fetal Heart Rate: pos               Urine Glucose Read-only: Negative  Urine Protein Read-only: Negative           Assessment and Plan    Problem List Items Addressed This Visit          Gravid and     Pregnancy - Primary    Relevant Orders    POC Urinalysis Dipstick (Completed)    US Ob Follow Up Transabdominal Approach     Other Visit Diagnoses         Obesity affecting pregnancy, antepartum, unspecified obesity type        Relevant Orders    US Ob Follow Up Transabdominal Approach            Pregnancy at 29w3d  Fetal status reassuring.  28 week labs reviewed.    Activity and  Exercise discussed.  Fetal movement/PTL or Labor precautions  Return in about 2 weeks (around 4/18/2025) for US with Next Visit.    Daja Vargas MD  04/04/2025

## 2025-04-18 ENCOUNTER — ROUTINE PRENATAL (OUTPATIENT)
Dept: OBSTETRICS AND GYNECOLOGY | Facility: CLINIC | Age: 25
End: 2025-04-18
Payer: COMMERCIAL

## 2025-04-18 VITALS — BODY MASS INDEX: 35.07 KG/M2 | WEIGHT: 198 LBS | DIASTOLIC BLOOD PRESSURE: 68 MMHG | SYSTOLIC BLOOD PRESSURE: 110 MMHG

## 2025-04-18 DIAGNOSIS — Z3A.31 31 WEEKS GESTATION OF PREGNANCY: Primary | ICD-10-CM

## 2025-04-18 DIAGNOSIS — O35.9XX0 SUSPECTED FETAL ABNORMALITY AFFECTING MANAGEMENT OF MOTHER, ANTEPARTUM, SINGLE OR UNSPECIFIED FETUS: ICD-10-CM

## 2025-04-18 DIAGNOSIS — O36.61X0 MACROSOMIA OF FETUS AFFECTING MANAGEMENT OF MOTHER IN FIRST TRIMESTER, SINGLE OR UNSPECIFIED FETUS: ICD-10-CM

## 2025-04-18 LAB
CLARITY, POC: CLEAR
COLOR UR: YELLOW
GLUCOSE UR STRIP-MCNC: NEGATIVE MG/DL
PROT UR STRIP-MCNC: NEGATIVE MG/DL

## 2025-04-18 NOTE — PROGRESS NOTES
OB FOLLOW UP  CC- Here for care of pregnancy        Nora Monroy is a 25 y.o.  31w3d patient being seen today for her obstetrical follow up visit. Patient reports fatigue, increased stress.    Her prenatal care is complicated by (and status) :  see below.  Patient Active Problem List   Diagnosis    Pregnancy    Vapes nicotine containing substance    Suspected fetal abnormality affecting management of mother, antepartum       Flu Status: Declines  TDAP status: declines  RSV vaccine: declines.  Rhogam status: was not indicated  28 week labs: Reviewed and Labs show anemia. She is taking additional iron supplement.  Ultrasound Today: Yes  Non Stress Test: No.      ROS -   Patient Denies: Loss of Fluid, Vaginal Spotting, Vision Changes, Headaches, Nausea , Vomiting , Contractions, Epigastric pain, and skin itching  Fetal Movement : normal  Other than what is documented in the HPI, all other systems reviewed and are negative.     The additional following portions of the patient's history were reviewed and updated as appropriate: allergies, current medications, past family history, past medical history, past social history, past surgical history, and problem list.    I have reviewed and agree with the HPI, ROS, and historical information as entered above. Daja Vargas MD      /68   Wt 89.8 kg (198 lb)   BMI 35.07 kg/m²         EXAM:     Prenatal Vitals  BP: 110/68  Weight: 89.8 kg (198 lb)   Fetal Heart Rate: pos                           Assessment and Plan    Problem List Items Addressed This Visit          Gravid and     Suspected fetal abnormality affecting management of mother, antepartum - Primary     Other Visit Diagnoses         Macrosomia of fetus affecting management of mother in first trimester, single or unspecified fetus                Pregnancy at 31w3d  Fetal status reassuring.  U/S reviewed.  Baby LGA.  ENcouraged watching diet, increaseing exercise.  She reports this  is difficult with current job so I recommend not working at this point so can concentrate on her health, stress reduction and optimizing pregnancy outcomes.  28 week labs reviewed.    Activity and Exercise discussed.  Fetal movement/PTL or Labor precautions  Return in about 2 weeks (around 5/2/2025), or OFF WORK.    Daja Vargas MD  04/18/2025

## 2025-04-25 ENCOUNTER — TELEPHONE (OUTPATIENT)
Dept: OBSTETRICS AND GYNECOLOGY | Facility: CLINIC | Age: 25
End: 2025-04-25
Payer: COMMERCIAL

## 2025-04-25 NOTE — TELEPHONE ENCOUNTER
LOS   32w3d    Pt reports swelling decreases with elevation of legs and rest. She also reports taking BP this am 111/77. She reports BP 10 minutes ago after working is 132/90. She denies any HA or VC. Appt offered today for 1:30. Pt request to monitor BP and swelling. Will go to L&D or CB with worsening symptoms.

## 2025-04-25 NOTE — TELEPHONE ENCOUNTER
Alicia pt. . 32w3d. Pt sent MyChart message last night reporting that she had pitting edema. She then sent another VoiceObjectshart message last night that she talked to the on call MD and he instructed her to be seen today. Dr. Vargas' nurse tried contacting pt via [employer] message today asking her to come to office at 1330. Pt spoke with phone RN today and stated that edema decreased with elevation of legs. Pt reported BP of 111/77 @ 0818 today and 132/90 @ 1146 today. Pt stated to phone RN that she would call back or report to L&D if her symptoms worsened. Pt called back reporting that she tested her urine at work and that the reading came back 3+ protein. Dr. Vargas' RN (LIZY Wakefield) stated that pt should report to L&D. Pt notified and verbalized understanding.

## 2025-04-25 NOTE — TELEPHONE ENCOUNTER
Provider: DR. GALAN    Caller: Nora Monroy    Relationship to Patient: Self    Pharmacy:     Phone Number: [phone]    Reason for Call: RESULTS     When was the patient last seen: 04.18.25    PATIENT HAD HER PROTEIN TESTED WHILE SHE WAS AT WORK TODAY 04.25.25 AND THE RESULTS CAME BACK AT 3+ FOR PROTEIN.     PATIENT WAS ALSO TESTED FOR UTI AT THIS TIME THE RESULTS ARE NEG    PATIENT CAN BE REACHED .[phone]    THANK YOU

## 2025-04-25 NOTE — TELEPHONE ENCOUNTER
Caller: Nora Monroy    Relationship: Self    Best call back number: [phone]    What is the best time to reach you: ASAP    Do you know the name of the person who called: DAGOBERTO    What was the call regarding: CALL BACK IN REGARDS TO [employer] MESSAGE    Is it okay if the provider responds through MyChart: CALL BACK

## 2025-04-28 RX ORDER — LANSOPRAZOLE 30 MG/1
30 CAPSULE, DELAYED RELEASE ORAL DAILY
Qty: 30 CAPSULE | Refills: 0 | Status: SHIPPED | OUTPATIENT
Start: 2025-04-28

## 2025-05-02 ENCOUNTER — ROUTINE PRENATAL (OUTPATIENT)
Dept: OBSTETRICS AND GYNECOLOGY | Facility: CLINIC | Age: 25
End: 2025-05-02
Payer: COMMERCIAL

## 2025-05-02 VITALS — BODY MASS INDEX: 35.96 KG/M2 | SYSTOLIC BLOOD PRESSURE: 102 MMHG | WEIGHT: 203 LBS | DIASTOLIC BLOOD PRESSURE: 76 MMHG

## 2025-05-02 DIAGNOSIS — O36.60X0 EXCESSIVE FETAL GROWTH AFFECTING MANAGEMENT OF PREGNANCY, ANTEPARTUM, SINGLE OR UNSPECIFIED FETUS: ICD-10-CM

## 2025-05-02 DIAGNOSIS — Z3A.33 33 WEEKS GESTATION OF PREGNANCY: Primary | ICD-10-CM

## 2025-05-02 DIAGNOSIS — O35.BXX0 ECHOGENIC FOCUS OF HEART OF FETUS AFFECTING ANTEPARTUM CARE OF MOTHER, SINGLE OR UNSPECIFIED FETUS: ICD-10-CM

## 2025-05-02 NOTE — PROGRESS NOTES
OB FOLLOW UP  CC- Here for care of pregnancy        Nora Monroy is a 25 y.o.  33w3d patient being seen today for her obstetrical follow up visit. Patient reports rectal pressure and states she has been very uncomfortable and has had some pelvic pain that a belly band helps at times. She also reports swelling in her ankles with no pitting. Her blood pressure has been good and rest/compression socks help with her swelling.    Her prenatal care is complicated by (and status) : see below.  Patient Active Problem List   Diagnosis    Pregnancy    Vapes nicotine containing substance    Suspected fetal abnormality affecting management of mother, antepartum    Echogenic focus of heart of fetus affecting antepartum care of mother    Excessive fetal growth affecting management of mother, antepartum       Flu Status: Declines  RSV: declines.  Ultrasound Today: No  Non Stress Test: No.    ROS -   Patient Denies: Loss of Fluid, Vaginal Spotting, Vision Changes, Headaches, Nausea , Vomiting , Contractions, Epigastric pain, and skin itching  Fetal Movement : normal  Other than what is documented in the HPI, all other systems reviewed and are negative.       The additional following portions of the patient's history were reviewed and updated as appropriate: allergies, current medications, past family history, past medical history, past social history, past surgical history, and problem list.    I have reviewed and agree with the HPI, ROS, and historical information as entered above. Daja Vargas MD      /76   Wt 92.1 kg (203 lb)   BMI 35.96 kg/m²       EXAM:     Prenatal Vitals  BP: 102/76  Weight: 92.1 kg (203 lb)   Fetal Heart Rate: pos                           Assessment and Plan    Problem List Items Addressed This Visit          Gravid and     Pregnancy - Primary    Echogenic focus of heart of fetus affecting antepartum care of mother    Excessive fetal growth affecting management of  mother, antepartum       Pregnancy at 33w3d  Fetal status reassuring.   Activity and Exercise discussed.  Fetal movement/PTL or Labor precautions  Return in about 2 weeks (around 5/16/2025).    Daja Vargas MD  05/02/2025

## 2025-05-16 ENCOUNTER — ROUTINE PRENATAL (OUTPATIENT)
Dept: OBSTETRICS AND GYNECOLOGY | Facility: CLINIC | Age: 25
End: 2025-05-16
Payer: COMMERCIAL

## 2025-05-16 VITALS — DIASTOLIC BLOOD PRESSURE: 80 MMHG | SYSTOLIC BLOOD PRESSURE: 118 MMHG | WEIGHT: 207 LBS | BODY MASS INDEX: 36.67 KG/M2

## 2025-05-16 DIAGNOSIS — O36.60X0 EXCESSIVE FETAL GROWTH AFFECTING MANAGEMENT OF PREGNANCY, ANTEPARTUM, SINGLE OR UNSPECIFIED FETUS: Primary | ICD-10-CM

## 2025-05-16 DIAGNOSIS — O35.BXX0 ECHOGENIC FOCUS OF HEART OF FETUS AFFECTING ANTEPARTUM CARE OF MOTHER, SINGLE OR UNSPECIFIED FETUS: ICD-10-CM

## 2025-05-16 DIAGNOSIS — Z34.03 FIRST PREGNANCY, THIRD TRIMESTER: ICD-10-CM

## 2025-05-16 LAB
GLUCOSE UR STRIP-MCNC: NEGATIVE MG/DL
PROT UR STRIP-MCNC: NEGATIVE MG/DL

## 2025-05-16 NOTE — PROGRESS NOTES
OB FOLLOW UP  CC- Here for care of pregnancy        Nora Monroy is a 25 y.o.  35w3d patient being seen today for her obstetrical follow up visit. Patient reports having contractions 1-2 times a days.     Her prenatal care is complicated by (and status) : see below.  Patient Active Problem List   Diagnosis    Pregnancy    Vapes nicotine containing substance    Suspected fetal abnormality affecting management of mother, antepartum    Echogenic focus of heart of fetus affecting antepartum care of mother    Excessive fetal growth affecting management of mother, antepartum       Flu Status: Declines  RSV: declines.  Ultrasound Today: No  Non Stress Test: No.    ROS -   Patient Denies: Loss of Fluid, Vaginal Spotting, Vision Changes, Headaches, Nausea , Vomiting , Contractions, Epigastric pain, and skin itching  Fetal Movement : normal  Other than what is documented in the HPI, all other systems reviewed and are negative.       The additional following portions of the patient's history were reviewed and updated as appropriate: allergies, current medications, past family history, past medical history, past social history, past surgical history, and problem list.    I have reviewed and agree with the HPI, ROS, and historical information as entered above. Daja Vargas MD      /80   Wt 93.9 kg (207 lb)   BMI 36.67 kg/m²       EXAM:     Prenatal Vitals  BP: 118/80  Weight: 93.9 kg (207 lb)   Fetal Heart Rate: pos                           Assessment and Plan    Problem List Items Addressed This Visit          Gravid and     Echogenic focus of heart of fetus affecting antepartum care of mother    Excessive fetal growth affecting management of mother, antepartum - Primary    Relevant Orders    US Ob Follow Up Transabdominal Approach       Pregnancy at 35w3d  Fetal status reassuring.   Activity and Exercise discussed.  Fetal movement/PTL or Labor precautions  GBS next visit  Return in about  1 week (around 5/23/2025) for US with Next Visit.    Daja Vargas MD  05/16/2025

## 2025-05-22 ENCOUNTER — ROUTINE PRENATAL (OUTPATIENT)
Dept: OBSTETRICS AND GYNECOLOGY | Facility: CLINIC | Age: 25
End: 2025-05-22
Payer: COMMERCIAL

## 2025-05-22 ENCOUNTER — LAB (OUTPATIENT)
Dept: LAB | Facility: HOSPITAL | Age: 25
End: 2025-05-22
Payer: COMMERCIAL

## 2025-05-22 VITALS — BODY MASS INDEX: 36.67 KG/M2 | DIASTOLIC BLOOD PRESSURE: 68 MMHG | WEIGHT: 207 LBS | SYSTOLIC BLOOD PRESSURE: 102 MMHG

## 2025-05-22 DIAGNOSIS — Z34.03 PRENATAL CARE, FIRST PREGNANCY IN THIRD TRIMESTER: ICD-10-CM

## 2025-05-22 DIAGNOSIS — O99.210 OBESITY AFFECTING PREGNANCY, ANTEPARTUM, UNSPECIFIED OBESITY TYPE: ICD-10-CM

## 2025-05-22 DIAGNOSIS — O35.BXX0 ECHOGENIC FOCUS OF HEART OF FETUS AFFECTING ANTEPARTUM CARE OF MOTHER, SINGLE OR UNSPECIFIED FETUS: ICD-10-CM

## 2025-05-22 DIAGNOSIS — Z3A.36 36 WEEKS GESTATION OF PREGNANCY: ICD-10-CM

## 2025-05-22 DIAGNOSIS — O36.60X0 EXCESSIVE FETAL GROWTH AFFECTING MANAGEMENT OF PREGNANCY, ANTEPARTUM, SINGLE OR UNSPECIFIED FETUS: ICD-10-CM

## 2025-05-22 DIAGNOSIS — Z34.03 PRENATAL CARE, FIRST PREGNANCY IN THIRD TRIMESTER: Primary | ICD-10-CM

## 2025-05-22 LAB
GLUCOSE UR STRIP-MCNC: NEGATIVE MG/DL
PROT UR STRIP-MCNC: NEGATIVE MG/DL

## 2025-05-22 PROCEDURE — 87081 CULTURE SCREEN ONLY: CPT

## 2025-05-22 NOTE — PROGRESS NOTES
OB FOLLOW UP  CC- Here for care of pregnancy        Nora Monroy is a 25 y.o.  36w2d patient being seen today for her obstetrical follow up visit. Patient reports irregular contractions and cramping.     Her prenatal care is complicated by (and status) : see below.  Patient Active Problem List   Diagnosis    Pregnancy    Vapes nicotine containing substance    Suspected fetal abnormality affecting management of mother, antepartum    Echogenic focus of heart of fetus affecting antepartum care of mother    Excessive fetal growth affecting management of mother, antepartum       GBS Status: Done Today. She is not allergic to PCN.  No Known Allergies     Her Delivery Plan is:  Desires spontaneous labor     US today: Yes. FHR: 140bpm. Cephalic. RACHNA: 21.8CM. HC: 92%. AC: >99%. EFW: 95%- 7lb 12oz.   Non Stress Test: No    ROS -   Patient Denies: Loss of Fluid, Vaginal Spotting, Vision Changes, Headaches, Nausea , Vomiting , Epigastric pain, and skin itching  Fetal Movement : normal  Other than what is documented in the HPI, all other systems reviewed and are negative.       The additional following portions of the patient's history were reviewed and updated as appropriate: allergies, current medications, and problem list.    I have reviewed and agree with the HPI, ROS, and historical information as entered above. Daja Vargas MD        EXAM:     Prenatal Vitals  BP: 102/68  Weight: 93.9 kg (207 lb)   Fetal Heart Rate: 140/US              Urine Glucose Read-only: Negative  Urine Protein Read-only: Negative           Assessment and Plan    Problem List Items Addressed This Visit          Gravid and     Pregnancy    Echogenic focus of heart of fetus affecting antepartum care of mother    Relevant Orders    POC Urinalysis Dipstick (Completed)    Group B Streptococcus Culture - Swab, Vaginal/Rectum    Excessive fetal growth affecting management of mother, antepartum    Relevant Orders    POC Urinalysis  Dipstick (Completed)    Group B Streptococcus Culture - Swab, Vaginal/Rectum     Other Visit Diagnoses         Prenatal care, first pregnancy in third trimester    -  Primary    Relevant Orders    POC Urinalysis Dipstick (Completed)    Group B Streptococcus Culture - Swab, Vaginal/Rectum      Obesity affecting pregnancy, antepartum, unspecified obesity type        Relevant Orders    POC Urinalysis Dipstick (Completed)    Group B Streptococcus Culture - Swab, Vaginal/Rectum            Pregnancy at 36w2d  DIscussed macrosomia and options for delivery.  Fetal status reassuring.   Reviewed Pre-eclampsia signs/symptoms  Delivery options reviewed with patient  Signs of labor reviewed  Kick counts reviewed  Activity and Exercise discussed.  Return in about 1 week (around 5/29/2025).    Daja Vargas MD  05/22/2025

## 2025-05-25 LAB — BACTERIA SPEC AEROBE CULT: NORMAL

## 2025-05-28 RX ORDER — LANSOPRAZOLE 30 MG/1
30 CAPSULE, DELAYED RELEASE ORAL DAILY
Qty: 30 CAPSULE | Refills: 0 | Status: SHIPPED | OUTPATIENT
Start: 2025-05-28

## 2025-05-30 ENCOUNTER — ROUTINE PRENATAL (OUTPATIENT)
Dept: OBSTETRICS AND GYNECOLOGY | Facility: CLINIC | Age: 25
End: 2025-05-30
Payer: COMMERCIAL

## 2025-05-30 VITALS — DIASTOLIC BLOOD PRESSURE: 78 MMHG | SYSTOLIC BLOOD PRESSURE: 120 MMHG | BODY MASS INDEX: 36.31 KG/M2 | WEIGHT: 205 LBS

## 2025-05-30 DIAGNOSIS — Z3A.37 37 WEEKS GESTATION OF PREGNANCY: Primary | ICD-10-CM

## 2025-05-30 NOTE — PROGRESS NOTES
OB FOLLOW UP  CC- Here for care of pregnancy        Nora Monroy is a 25 y.o.  37w3d patient being seen today for her obstetrical follow up visit. Patient reports pelvic pressure and irregular [place]/contractions.     She denies LOF, vaginal spotting, headaches, vision changes, swelling. She reports adequate fetal movements, >10 movements in 10 hours.      Her prenatal care is complicated by (and status) :   Patient Active Problem List   Diagnosis    Pregnancy    Vapes nicotine containing substance    Suspected fetal abnormality affecting management of mother, antepartum    Echogenic focus of heart of fetus affecting antepartum care of mother    Excessive fetal growth affecting management of mother, antepartum       GBS Status: negative   Group B Strep Culture   Date Value Ref Range Status   2025 No Group B Streptococcus isolated  Final         No Known Allergies     Her Delivery Plan is: wants to schedule 39 week IOL   US today: no  Non Stress Test: No.    ROS -   Patient Denies: Loss of Fluid, Vaginal Spotting, Vision Changes, Headaches, Nausea , Vomiting , Epigastric pain, and skin itching  Fetal Movement : normal  Other than what is documented in the HPI, all other systems reviewed and are negative.       The additional following portions of the patient's history were reviewed and updated as appropriate: allergies, current medications, past family history, past medical history, past social history, past surgical history, and problem list.    I have reviewed and agree with the HPI, ROS, and historical information as entered above. Jessie Tejeda, APRN        EXAM:     Prenatal Vitals  BP: 120/78  Weight: 93 kg (205 lb)   Fetal Heart Rate: pos                          Assessment and Plan    Problem List Items Addressed This Visit          Gravid and     Pregnancy - Primary       Pregnancy at 37w3d  Fetal status reassuring.   Discussed options for delivery of suspected  macrosomia with patient. Pt. desires IOL at 39 weeks.  Delivery options reviewed with patient  Signs of labor reviewed  Kick counts reviewed  Activity and Exercise discussed.  Return in about 1 week (around 6/6/2025) for FIDELINA Tejeda, APRN  05/30/2025

## 2025-06-05 ENCOUNTER — ROUTINE PRENATAL (OUTPATIENT)
Dept: OBSTETRICS AND GYNECOLOGY | Facility: CLINIC | Age: 25
End: 2025-06-05
Payer: COMMERCIAL

## 2025-06-05 VITALS — BODY MASS INDEX: 36.46 KG/M2 | WEIGHT: 205.8 LBS | SYSTOLIC BLOOD PRESSURE: 120 MMHG | DIASTOLIC BLOOD PRESSURE: 76 MMHG

## 2025-06-05 DIAGNOSIS — Z34.03 PRENATAL CARE, FIRST PREGNANCY IN THIRD TRIMESTER: Primary | ICD-10-CM

## 2025-06-05 LAB
GLUCOSE UR STRIP-MCNC: NEGATIVE MG/DL
PROT UR STRIP-MCNC: NEGATIVE MG/DL

## 2025-06-05 NOTE — PROGRESS NOTES
OB FOLLOW UP  CC- Here for care of pregnancy        Nora Monroy is a 25 y.o.  38w2d patient being seen today for her obstetrical follow up visit. Patient reports irregular contractions.     Her prenatal care is complicated by (and status) : see below.  Patient Active Problem List   Diagnosis    Pregnancy    Vapes nicotine containing substance    Suspected fetal abnormality affecting management of mother, antepartum    Echogenic focus of heart of fetus affecting antepartum care of mother    Excessive fetal growth affecting management of mother, antepartum       GBS Status:   Group B Strep Culture   Date Value Ref Range Status   2025 No Group B Streptococcus isolated  Final         No Known Allergies       Her Delivery Plan is: IOL scheduled  @ midnight.     US today: no  Non Stress Test: No.    ROS -   Patient Denies: Loss of Fluid, Vaginal Spotting, Vision Changes, Headaches, Contractions, Epigastric pain, and skin itching  Fetal Movement : normal  Other than what is documented in the HPI, all other systems reviewed and are negative.       The additional following portions of the patient's history were reviewed and updated as appropriate: allergies and current medications.    I have reviewed and agree with the HPI, ROS, and historical information as entered above. Daja Vargas MD        EXAM:     Prenatal Vitals  BP: 120/76  Weight: 93.4 kg (205 lb 12.8 oz)   Fetal Heart Rate: pos       Dilation/Effacement/Station  Dilation: 1  Effacement (%): 0      Urine Glucose Read-only: Negative  Urine Protein Read-only: Negative           Assessment and Plan    Problem List Items Addressed This Visit    None  Visit Diagnoses         Prenatal care, first pregnancy in third trimester    -  Primary    Relevant Orders    POC Urinalysis Dipstick (Completed)            Pregnancy at 38w2d  Fetal status reassuring.   Reviewed Pre-eclampsia signs/symptoms  Delivery options reviewed with patient  Signs of  labor reviewed  Kick counts reviewed  Activity and Exercise discussed.  Macrosomia - she is considering c/s vs. Induction.  Cancel current induction  Return in about 6 days (around 6/11/2025).    Daja Vargas MD  06/05/2025

## 2025-06-11 ENCOUNTER — ROUTINE PRENATAL (OUTPATIENT)
Dept: OBSTETRICS AND GYNECOLOGY | Facility: CLINIC | Age: 25
End: 2025-06-11
Payer: COMMERCIAL

## 2025-06-11 VITALS — BODY MASS INDEX: 36.56 KG/M2 | WEIGHT: 206.4 LBS | SYSTOLIC BLOOD PRESSURE: 128 MMHG | DIASTOLIC BLOOD PRESSURE: 78 MMHG

## 2025-06-11 DIAGNOSIS — O35.BXX0 ECHOGENIC FOCUS OF HEART OF FETUS AFFECTING ANTEPARTUM CARE OF MOTHER, SINGLE OR UNSPECIFIED FETUS: ICD-10-CM

## 2025-06-11 DIAGNOSIS — Z3A.39 39 WEEKS GESTATION OF PREGNANCY: Primary | ICD-10-CM

## 2025-06-11 DIAGNOSIS — O36.60X0 EXCESSIVE FETAL GROWTH AFFECTING MANAGEMENT OF PREGNANCY, ANTEPARTUM, SINGLE OR UNSPECIFIED FETUS: ICD-10-CM

## 2025-06-11 LAB
GLUCOSE UR STRIP-MCNC: NEGATIVE MG/DL
PROT UR STRIP-MCNC: NEGATIVE MG/DL

## 2025-06-11 NOTE — PROGRESS NOTES
OB FOLLOW UP  CC- Here for care of pregnancy        Nora Monroy is a 25 y.o.  39w1d patient being seen today for her obstetrical follow up visit. Patient reports irregular contractions, cramping, and increased vaginal pressure.     Her prenatal care is complicated by (and status) : see below.  Patient Active Problem List   Diagnosis    Pregnancy    Vapes nicotine containing substance    Suspected fetal abnormality affecting management of mother, antepartum    Echogenic focus of heart of fetus affecting antepartum care of mother    Excessive fetal growth affecting management of mother, antepartum       GBS Status:   Group B Strep Culture   Date Value Ref Range Status   2025 No Group B Streptococcus isolated  Final         No Known Allergies     Her Delivery Plan is: Discuss today.    US today: No  Non Stress Test: Yes, 20 minutes   Non-stress test: NST: Reactive  Indication: Fetal Macrosomia    ROS -   Patient Denies: Loss of Fluid, Vaginal Spotting, Vision Changes, Headaches, Nausea , Vomiting , Epigastric pain, and skin itching  Fetal Movement : normal  Other than what is documented in the HPI, all other systems reviewed and are negative.       The additional following portions of the patient's history were reviewed and updated as appropriate: allergies, current medications, and problem list.    I have reviewed and agree with the HPI, ROS, and historical information as entered above. Daja Vargas MD        EXAM:     Prenatal Vitals  BP: 128/78  Weight: 93.6 kg (206 lb 6.4 oz)   Fetal Heart Rate: NST       Dilation/Effacement/Station  Dilation: 2  Effacement (%): 50  NST NOTE    Indiction :   Macrosomia  FHR:          Reactive, Cat 1, No decels  Contractions:    Irregular  Time Monitored:   > 20 minutes     Urine Glucose Read-only: Negative  Urine Protein Read-only: Negative           Assessment and Plan    Problem List Items Addressed This Visit          Gravid and     Pregnancy -  Primary    Relevant Orders    POC Urinalysis Dipstick (Completed)    Echogenic focus of heart of fetus affecting antepartum care of mother    Relevant Orders    POC Urinalysis Dipstick (Completed)    Excessive fetal growth affecting management of mother, antepartum    Relevant Orders    POC Urinalysis Dipstick (Completed)    US Ob Follow Up Transabdominal Approach       Pregnancy at 39w1d  Fetal status reassuring.  Discussed pros/cons of induction.  After careful consideration will wait through weekend and repeat growth scan on Tuesday.    Reviewed Pre-eclampsia signs/symptoms  Delivery options reviewed with patient  Signs of labor reviewed  Kick counts reviewed  Activity and Exercise discussed.  Return in about 6 days (around 6/17/2025) for US with Next Visit.    Daja Vargas MD  06/11/2025